# Patient Record
Sex: FEMALE | Race: WHITE | NOT HISPANIC OR LATINO | URBAN - METROPOLITAN AREA
[De-identification: names, ages, dates, MRNs, and addresses within clinical notes are randomized per-mention and may not be internally consistent; named-entity substitution may affect disease eponyms.]

---

## 2023-11-24 ENCOUNTER — HOSPITAL ENCOUNTER (EMERGENCY)
Facility: HOSPITAL | Age: 83
Discharge: HOME | End: 2023-11-24
Attending: STUDENT IN AN ORGANIZED HEALTH CARE EDUCATION/TRAINING PROGRAM
Payer: MEDICARE

## 2023-11-24 ENCOUNTER — APPOINTMENT (OUTPATIENT)
Dept: RADIOLOGY | Facility: HOSPITAL | Age: 83
End: 2023-11-24
Payer: MEDICARE

## 2023-11-24 VITALS
WEIGHT: 142 LBS | TEMPERATURE: 98.6 F | HEIGHT: 65 IN | SYSTOLIC BLOOD PRESSURE: 183 MMHG | HEART RATE: 82 BPM | OXYGEN SATURATION: 95 % | DIASTOLIC BLOOD PRESSURE: 79 MMHG | RESPIRATION RATE: 16 BRPM | BODY MASS INDEX: 23.66 KG/M2
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
## 2023-11-24 DIAGNOSIS — R07.81 RIB PAIN ON LEFT SIDE: Primary | ICD-10-CM

## 2023-11-24 DIAGNOSIS — S22.000S THORACIC COMPRESSION FRACTURE, SEQUELA: ICD-10-CM

## 2023-11-24 LAB
ALBUMIN SERPL BCP-MCNC: 4 G/DL (ref 3.4–5)
ALP SERPL-CCNC: 68 U/L (ref 33–136)
ALT SERPL W P-5'-P-CCNC: 18 U/L (ref 7–45)
ANION GAP SERPL CALC-SCNC: 11 MMOL/L (ref 10–20)
AST SERPL W P-5'-P-CCNC: 26 U/L (ref 9–39)
BASOPHILS # BLD AUTO: 0.04 X10*3/UL (ref 0–0.1)
BASOPHILS NFR BLD AUTO: 0.7 %
BILIRUB SERPL-MCNC: 0.7 MG/DL (ref 0–1.2)
BUN SERPL-MCNC: 21 MG/DL (ref 6–23)
CALCIUM SERPL-MCNC: 9.8 MG/DL (ref 8.6–10.3)
CHLORIDE SERPL-SCNC: 107 MMOL/L (ref 98–107)
CO2 SERPL-SCNC: 27 MMOL/L (ref 21–32)
CREAT SERPL-MCNC: 0.93 MG/DL (ref 0.5–1.05)
EOSINOPHIL # BLD AUTO: 0.13 X10*3/UL (ref 0–0.4)
EOSINOPHIL NFR BLD AUTO: 2.3 %
ERYTHROCYTE [DISTWIDTH] IN BLOOD BY AUTOMATED COUNT: 14.3 % (ref 11.5–14.5)
GFR SERPL CREATININE-BSD FRML MDRD: 61 ML/MIN/1.73M*2
GLUCOSE SERPL-MCNC: 171 MG/DL (ref 74–99)
HCT VFR BLD AUTO: 41.5 % (ref 36–46)
HGB BLD-MCNC: 13 G/DL (ref 12–16)
IMM GRANULOCYTES # BLD AUTO: 0.02 X10*3/UL (ref 0–0.5)
IMM GRANULOCYTES NFR BLD AUTO: 0.3 % (ref 0–0.9)
LYMPHOCYTES # BLD AUTO: 1.36 X10*3/UL (ref 0.8–3)
LYMPHOCYTES NFR BLD AUTO: 23.7 %
MAGNESIUM SERPL-MCNC: 1.55 MG/DL (ref 1.6–2.4)
MCH RBC QN AUTO: 27.1 PG (ref 26–34)
MCHC RBC AUTO-ENTMCNC: 31.3 G/DL (ref 32–36)
MCV RBC AUTO: 87 FL (ref 80–100)
MONOCYTES # BLD AUTO: 0.35 X10*3/UL (ref 0.05–0.8)
MONOCYTES NFR BLD AUTO: 6.1 %
NEUTROPHILS # BLD AUTO: 3.83 X10*3/UL (ref 1.6–5.5)
NEUTROPHILS NFR BLD AUTO: 66.9 %
NRBC BLD-RTO: 0 /100 WBCS (ref 0–0)
PLATELET # BLD AUTO: 153 X10*3/UL (ref 150–450)
POTASSIUM SERPL-SCNC: 4 MMOL/L (ref 3.5–5.3)
PROT SERPL-MCNC: 6.7 G/DL (ref 6.4–8.2)
RBC # BLD AUTO: 4.8 X10*6/UL (ref 4–5.2)
SODIUM SERPL-SCNC: 141 MMOL/L (ref 136–145)
WBC # BLD AUTO: 5.7 X10*3/UL (ref 4.4–11.3)

## 2023-11-24 PROCEDURE — 85025 COMPLETE CBC W/AUTO DIFF WBC: CPT | Performed by: PHYSICIAN ASSISTANT

## 2023-11-24 PROCEDURE — 71250 CT THORAX DX C-: CPT | Performed by: RADIOLOGY

## 2023-11-24 PROCEDURE — 72125 CT NECK SPINE W/O DYE: CPT

## 2023-11-24 PROCEDURE — 36415 COLL VENOUS BLD VENIPUNCTURE: CPT | Performed by: PHYSICIAN ASSISTANT

## 2023-11-24 PROCEDURE — 84075 ASSAY ALKALINE PHOSPHATASE: CPT | Performed by: PHYSICIAN ASSISTANT

## 2023-11-24 PROCEDURE — 70450 CT HEAD/BRAIN W/O DYE: CPT | Performed by: RADIOLOGY

## 2023-11-24 PROCEDURE — 83735 ASSAY OF MAGNESIUM: CPT | Performed by: PHYSICIAN ASSISTANT

## 2023-11-24 PROCEDURE — 99285 EMERGENCY DEPT VISIT HI MDM: CPT | Mod: 25 | Performed by: STUDENT IN AN ORGANIZED HEALTH CARE EDUCATION/TRAINING PROGRAM

## 2023-11-24 PROCEDURE — 71250 CT THORAX DX C-: CPT

## 2023-11-24 PROCEDURE — 72125 CT NECK SPINE W/O DYE: CPT | Performed by: RADIOLOGY

## 2023-11-24 PROCEDURE — 70450 CT HEAD/BRAIN W/O DYE: CPT

## 2023-11-24 RX ORDER — METOPROLOL SUCCINATE 25 MG/1
1 TABLET, EXTENDED RELEASE ORAL DAILY
COMMUNITY
Start: 2022-04-05

## 2023-11-24 RX ORDER — ASPIRIN 81 MG/1
81 TABLET ORAL 2 TIMES WEEKLY
COMMUNITY

## 2023-11-24 RX ORDER — LANOLIN ALCOHOL/MO/W.PET/CERES
1000 CREAM (GRAM) TOPICAL DAILY
COMMUNITY
Start: 2017-10-18

## 2023-11-24 RX ORDER — PRAVASTATIN SODIUM 20 MG/1
1 TABLET ORAL DAILY
COMMUNITY
Start: 2022-10-10

## 2023-11-24 RX ORDER — CRANBERRY FRUIT 450 MG
1 TABLET ORAL DAILY
COMMUNITY

## 2023-11-24 RX ORDER — FOSINOPIRL SODIUM 10 MG/1
1 TABLET ORAL DAILY
COMMUNITY
Start: 2017-11-27

## 2023-11-24 RX ORDER — ACETAMINOPHEN 500 MG
1 TABLET ORAL DAILY
COMMUNITY
Start: 2020-07-07

## 2023-11-24 ASSESSMENT — COLUMBIA-SUICIDE SEVERITY RATING SCALE - C-SSRS
1. IN THE PAST MONTH, HAVE YOU WISHED YOU WERE DEAD OR WISHED YOU COULD GO TO SLEEP AND NOT WAKE UP?: NO
2. HAVE YOU ACTUALLY HAD ANY THOUGHTS OF KILLING YOURSELF?: NO
6. HAVE YOU EVER DONE ANYTHING, STARTED TO DO ANYTHING, OR PREPARED TO DO ANYTHING TO END YOUR LIFE?: NO

## 2023-11-24 ASSESSMENT — LIFESTYLE VARIABLES
HAVE YOU EVER FELT YOU SHOULD CUT DOWN ON YOUR DRINKING: NO
HAVE PEOPLE ANNOYED YOU BY CRITICIZING YOUR DRINKING: NO
EVER HAD A DRINK FIRST THING IN THE MORNING TO STEADY YOUR NERVES TO GET RID OF A HANGOVER: NO
EVER FELT BAD OR GUILTY ABOUT YOUR DRINKING: NO
REASON UNABLE TO ASSESS: NO

## 2023-11-24 ASSESSMENT — PAIN SCALES - GENERAL: PAINLEVEL_OUTOF10: 4

## 2023-11-24 ASSESSMENT — PAIN - FUNCTIONAL ASSESSMENT: PAIN_FUNCTIONAL_ASSESSMENT: 0-10

## 2023-11-24 ASSESSMENT — PAIN DESCRIPTION - PROGRESSION: CLINICAL_PROGRESSION: GRADUALLY IMPROVING

## 2023-11-24 NOTE — ED PROVIDER NOTES
Brief medical screening exam was performed in triage due to high patient volume.  Patient endorses frequent falls over the past few months with the most recent being yesterday.  The patient states that she suffered a fall while in her hallway while in a standing position.  Patient reports that she believes she may have had a brief episode of dizziness provoking the fall.  She reports that she fell to her left side injuring her left ribs.  She states pain with deep inspiration and movement since.  Patient initially reported that she did not strike her head however she has not 100% sure.  Patient denies any use of anticoagulants.  She states that she has remained ambulatory and reports that she typically uses a walker or cane for assistance.    Limited physical examination revealed a well-appearing female in no distress.  Patient was found to have tenderness to the left mid anterolateral ribs without crepitus.  There is no tenderness to the midline sternum.  Abdomen soft and nontender.  I could not appreciate any obvious evidence of head trauma.  There is no midline cervical thoracic or lumbar spine tenderness on exam.    Given the patient's frequent falls along with report of increased generalized weakness blood work was obtained along with CT noncontrast imaging of the head cervical spine and chest.  The patient will be evaluated further in the main ED     Mynor Polk PA-C  11/24/23 0952

## 2023-11-24 NOTE — DISCHARGE INSTRUCTIONS
You have been seen at a [facility].  Please follow-up with your primary care provider in the next 1 to 2 days for further evaluation and routine follow-up.  Please return to the emergency room if having any worsening symptoms.  Please follow-up with any specialists if discussed during your emergency room stay.

## 2023-11-24 NOTE — ED PROVIDER NOTES
EMERGENCY MEDICINE EVALUATION NOTE    History of Present Illness     Chief Complaint:   Chief Complaint   Patient presents with    Fall       HPI: Iris Bowens is a 83 y.o. female with past medical history of gravis with chronic left eye drooping, diabetes, and hypertension who presents with complaint of fall.  Patient states she possibly lost her balance while she was at home yesterday evening.  States she did fall and land onto her left side and mainly onto her left rib cage.  She states that she has had some persistent pain since then and wants to be evaluated for possible fracture.  She denies any current shortness of breath although states while she is straining in the bathroom she does have worsening pain which does have some occasional breathing difficulty.  Denies any known head trauma, loss of consciousness, anticoagulant usage, chest pain, abdominal pain, or extremity pain.  No other complaints at this time.    Previous History   No past medical history on file.  Past Surgical History:   Procedure Laterality Date    BACK SURGERY  07/31/2015    Back Surgery    GALLBLADDER SURGERY  07/31/2015    Gallbladder Surgery    HYSTERECTOMY  07/31/2015    Hysterectomy        No family history on file.  No Known Allergies  Current Outpatient Medications   Medication Instructions    aspirin 81 mg, oral, 2 times weekly    calcium carbonate-vitamin D3 600 mg-20 mcg (800 unit) tablet 1 tablet, oral, Daily    cranberry fruit (cranberry) 450 mg tablet 1 tablet, oral, Daily    cyanocobalamin (VITAMIN B-12) 1,000 mcg, oral, Daily    fosinopril (Monopril) 10 mg tablet 1 tablet, oral, Daily    metoprolol succinate XL (Toprol-XL) 25 mg 24 hr tablet 1 tablet, oral, Daily    pravastatin (Pravachol) 20 mg tablet 1 tablet, oral, Daily       Physical Exam     Appearance: Alert, oriented , cooperative,  in acute distress. Well nourished & well hydrated.     Skin: Intact,  dry skin, no lesions, rash, petechiae or purpura.      Eyes:  PERRLA, EOMs intact,  Conjunctiva pink with no redness or exudates. Cornea & anterior chamber are clear, Eyelids without lesions. No scleral icterus.      ENT: Hearing grossly intact. External auditory canals patent, tympanic membranes intact with visible landmarks. Nares patent, mucus membranes moist. Dentition without lesions. Pharynx clear, uvula midline.      Neck: Supple, without meningismus. Thyroid not palpable. Trachea at midline. No lymphadenopathy.     Pulmonary: Clear bilaterally with good chest wall excursion. No rales, rhonchi or wheezing. No accessory muscle use or stridor.     Cardiac: Normal S1, S2 without murmur, rub, gallop or extrasystole. No JVD, Carotids without bruits.     Abdomen: Soft, nontender, active bowel sounds.  No palpable organomegaly.  No rebound or guarding.  No CVA tenderness.     Genitourinary: Exam deferred.     Musculoskeletal: Full range of motion. No edema, or deformity. Pulses full and equal. No cyanosis or clubbing.  Moderate tenderness throughout the left anterolateral rib cage with no obvious deformity noted.     Neurological: Chronic left eyelid droop, finger-nose touch is normal, normal sensation, no extremity weakness, no additional focal findings identified.     Psychiatric: Appropriate mood and affect.      Results     Labs Reviewed   CBC WITH AUTO DIFFERENTIAL - Abnormal       Result Value    WBC 5.7      nRBC 0.0      RBC 4.80      Hemoglobin 13.0      Hematocrit 41.5      MCV 87      MCH 27.1      MCHC 31.3 (*)     RDW 14.3      Platelets 153      Neutrophils % 66.9      Immature Granulocytes %, Automated 0.3      Lymphocytes % 23.7      Monocytes % 6.1      Eosinophils % 2.3      Basophils % 0.7      Neutrophils Absolute 3.83      Immature Granulocytes Absolute, Automated 0.02      Lymphocytes Absolute 1.36      Monocytes Absolute 0.35      Eosinophils Absolute 0.13      Basophils Absolute 0.04     COMPREHENSIVE METABOLIC PANEL - Abnormal    Glucose 171 (*)      Sodium 141      Potassium 4.0      Chloride 107      Bicarbonate 27      Anion Gap 11      Urea Nitrogen 21      Creatinine 0.93      eGFR 61      Calcium 9.8      Albumin 4.0      Alkaline Phosphatase 68      Total Protein 6.7      AST 26      Bilirubin, Total 0.7      ALT 18     MAGNESIUM - Abnormal    Magnesium 1.55 (*)      CT head wo IV contrast   Final Result   No acute intracranial abnormality was identified.        No detected fracture.        Mild cervical malalignment.        Cervical spine degenerative changes.        Mild arterial vascular calcifications.        Minimal arterial vascular calcifications.        MACRO:   None        Signed by: Eusebio Chavez 11/24/2023 4:03 PM   Dictation workstation:   [id number]      CT cervical spine wo IV contrast   Final Result   No acute intracranial abnormality was identified.        No detected fracture.        Mild cervical malalignment.        Cervical spine degenerative changes.        Mild arterial vascular calcifications.        Minimal arterial vascular calcifications.        MACRO:   None        Signed by: Eusebio Chavez 11/24/2023 4:03 PM   Dictation workstation:   [id number]      CT chest wo IV contrast   Final Result   No gross displaced acute fracture identified although inferior   portion of the ribs not fully included on the axial images.        Newly seen high-grade T11 compressive deformity most pronounced   centrally of indeterminate acuity although not obviously acute.   Attention to this region recommended on clinical assessment.        Large potential staghorn type calculus within the left renal   collecting system, incompletely imaged. Attention recommended on   follow-up assessment.        Signed by: Eusebio Chavez 11/24/2023 4:14 PM   Dictation workstation:   [id number]            ED Course & Medical Decision Making   Medications - No data to display  Diagnoses as of 11/24/23 1658   Fall, initial encounter   Rib pain on left side  "  Thoracic compression fracture, sequela     Heart Rate:  [65-81]   Temp:  [37 °C (98.6 °F)]   Resp:  [18]   BP: (177-196)/(79-87)   Height:  [165.1 cm (5' 5\")]   Weight:  [64.4 kg (142 lb)]   SpO2:  [96 %-97 %]      Iris Bowens is a 83 y.o. female with past medical history of gravis with chronic left eye drooping, diabetes, and hypertension who presents with complaint of fall.  Patient was initially hypertensive with a BP of 196/87, nontachycardic, afebrile, saturating 97% on room air.  Clear breath sounds all throughout.  She has some tenderness noted to the left lateral rib cage although no obvious deformity noted.  Low suspicion for pneumothorax low concern for possible traumatic injury and rib fractures.  Patient did receive a CT scan of the head, cervical spine, and chest.  This did not show any acute intracranial abnormality or significant fracture although a high-grade T11 compressive deformity was noted although most likely this is nonacute as patient has no tenderness overlying her thoracic spine at this time most likely chronic in nature.  No other acute osseous abnormality or fracture noted.  Patient maintain adequate oxygenation.  No leukocytosis or anemia noted.  No significant renal/electrolyte abnormality.  Mild hypomagnesemia of 1.55 without need for oral punishment and was informed to increase his in her diet.  She was offered pain medication of the refused and wished to be discharged home at this time.  She stated she will continue her home Aleve and Tylenol for pain control.  I did inform her to follow-up with her primary care provider for her imaging findings and possible neurosurgical evaluation.    Procedures   Procedures    Diagnosis     1. Rib pain on left side    2. Fall, initial encounter    3. Thoracic compression fracture, sequela        Disposition     DISCHARGE.  The patient is discharged back to their place of residence.  Discharge diagnosis, instructions and plan were discussed " and understood. At the time of discharge the patient was comfortable and was in no apparent distress. Patient is aware of diagnostic uncertainty and was notified though testing is negative here, there is a very small chance that pathology may be missed.  The patient understands these risks and the patient /family understood to return immediately to the emergency department if the symptoms worsen or if they have any additional concerns.    FOLLOW UP  Primary care provider in 1-2 days.        ED Prescriptions    None            Lopez Boone,   11/24/23 5042

## 2023-11-24 NOTE — ED TRIAGE NOTES
Pt states fell yesterday, fell onto L side. Pt unsure of cause of fall. Pt with probable LOC, pt cannot remember details of fall. Pt not currently on anticoagulation. Hx of palvix use in 2019, patient unable to say why. Hx of DM2 and myasthenia gravis. Pt using wheelchair in triage.

## 2023-11-24 NOTE — PROGRESS NOTES
Pharmacy Medication History Review    Iris Bowens is a 83 y.o. female admitted for No Principal Problem: There is no principal problem currently on the Problem List. Please update the Problem List and refresh.. Pharmacy reviewed the patient's [id number] medications and allergies for accuracy.    The list below reflectives the updated PTA list. Please review each medication in order reconciliation for additional clarification and justification.  (Not in a hospital admission)       The list below reflectives the updated allergy list. Please review each documented allergy for additional clarification and justification.  Allergies  Reviewed by Tamia Hester CPhT on 11/24/2023   No Known Allergies         Below are additional concerns with the patient's PTA list.    See PTA med list    Tamia Hester CPhT

## 2024-03-18 ENCOUNTER — TELEPHONE (OUTPATIENT)
Dept: NEUROSURGERY | Facility: CLINIC | Age: 84
End: 2024-03-18
Payer: MEDICARE

## 2024-12-04 ENCOUNTER — APPOINTMENT (OUTPATIENT)
Dept: SURGERY | Facility: CLINIC | Age: 84
End: 2024-12-04
Payer: MEDICARE

## 2025-06-20 ENCOUNTER — HOSPITAL ENCOUNTER (OUTPATIENT)
Dept: RADIOLOGY | Facility: HOSPITAL | Age: 85
Discharge: HOME | End: 2025-06-20
Payer: MEDICARE

## 2025-06-20 DIAGNOSIS — M25.512 PAIN IN LEFT SHOULDER: ICD-10-CM

## 2025-06-20 PROCEDURE — 73030 X-RAY EXAM OF SHOULDER: CPT | Mod: LT

## 2025-06-20 PROCEDURE — 73030 X-RAY EXAM OF SHOULDER: CPT | Mod: LEFT SIDE | Performed by: RADIOLOGY

## 2025-06-25 ENCOUNTER — HOSPITAL ENCOUNTER (OUTPATIENT)
Dept: VASCULAR MEDICINE | Facility: HOSPITAL | Age: 85
Discharge: HOME | End: 2025-06-25
Payer: MEDICARE

## 2025-06-25 DIAGNOSIS — R60.0 LOCALIZED EDEMA: ICD-10-CM

## 2025-06-25 DIAGNOSIS — M79.89 OTHER SPECIFIED SOFT TISSUE DISORDERS: ICD-10-CM

## 2025-06-25 PROCEDURE — 93971 EXTREMITY STUDY: CPT | Performed by: SURGERY

## 2025-06-25 PROCEDURE — 93971 EXTREMITY STUDY: CPT
